# Patient Record
Sex: MALE | Race: BLACK OR AFRICAN AMERICAN | URBAN - METROPOLITAN AREA
[De-identification: names, ages, dates, MRNs, and addresses within clinical notes are randomized per-mention and may not be internally consistent; named-entity substitution may affect disease eponyms.]

---

## 2017-12-02 ENCOUNTER — EMERGENCY (EMERGENCY)
Facility: HOSPITAL | Age: 27
LOS: 1 days | Discharge: HOME | End: 2017-12-02
Attending: INTERNAL MEDICINE | Admitting: INTERNAL MEDICINE
Payer: SELF-PAY

## 2017-12-02 VITALS
TEMPERATURE: 98 F | RESPIRATION RATE: 12 BRPM | DIASTOLIC BLOOD PRESSURE: 56 MMHG | OXYGEN SATURATION: 97 % | WEIGHT: 166.89 LBS | HEIGHT: 70 IN | SYSTOLIC BLOOD PRESSURE: 94 MMHG | HEART RATE: 72 BPM

## 2017-12-02 PROCEDURE — 99284 EMERGENCY DEPT VISIT MOD MDM: CPT | Mod: 25

## 2017-12-02 PROCEDURE — 73502 X-RAY EXAM HIP UNI 2-3 VIEWS: CPT | Mod: 26,RT

## 2017-12-02 PROCEDURE — 99283 EMERGENCY DEPT VISIT LOW MDM: CPT | Mod: 25

## 2017-12-02 PROCEDURE — 73502 X-RAY EXAM HIP UNI 2-3 VIEWS: CPT

## 2017-12-02 PROCEDURE — 99053 MED SERV 10PM-8AM 24 HR FAC: CPT

## 2017-12-02 NOTE — ED PROVIDER NOTE - OBJECTIVE STATEMENT
27 y/o BM with right hip pain, strained while in the pool, no trauma, he is ambulatory, no back pain, no weakness, no sensory changes.

## 2017-12-02 NOTE — ED ADULT NURSE NOTE - OBJECTIVE STATEMENT
patient a/o x 4 with a calm affect c/o right hip tightness and pain since sunday while playing basketball.  patient states he has no loss in ROM and has been walking on it fine, but it hurts when he runs.  pending XR